# Patient Record
Sex: FEMALE | Race: ASIAN | NOT HISPANIC OR LATINO | Employment: OTHER | URBAN - METROPOLITAN AREA
[De-identification: names, ages, dates, MRNs, and addresses within clinical notes are randomized per-mention and may not be internally consistent; named-entity substitution may affect disease eponyms.]

---

## 2018-05-30 ENCOUNTER — PATIENT OUTREACH (OUTPATIENT)
Dept: HEALTH INFORMATION MANAGEMENT | Facility: OTHER | Age: 78
End: 2018-05-30

## 2018-05-30 NOTE — PROGRESS NOTES
Outcome: Patient declined to verify , unable to inform of being on a recorded line or why we were calling as the patient hung up.

## 2018-09-04 PROCEDURE — 90662 IIV NO PRSV INCREASED AG IM: CPT | Performed by: REGISTERED NURSE

## 2018-09-04 PROCEDURE — G0008 ADMIN INFLUENZA VIRUS VAC: HCPCS | Performed by: REGISTERED NURSE

## 2018-09-10 ENCOUNTER — IMMUNIZATION (OUTPATIENT)
Dept: SOCIAL WORK | Facility: CLINIC | Age: 78
End: 2018-09-10
Payer: MEDICARE

## 2018-09-10 DIAGNOSIS — Z23 NEED FOR VACCINATION: ICD-10-CM

## 2018-10-10 ENCOUNTER — PATIENT OUTREACH (OUTPATIENT)
Dept: HEALTH INFORMATION MANAGEMENT | Facility: OTHER | Age: 78
End: 2018-10-10

## 2018-10-10 NOTE — PROGRESS NOTES
Pt declined to verify any type of info and said that if she needs something she will call back. Call back number was given.

## 2021-01-11 DIAGNOSIS — Z23 NEED FOR VACCINATION: ICD-10-CM

## 2021-04-05 ENCOUNTER — TELEPHONE (OUTPATIENT)
Dept: SCHEDULING | Facility: IMAGING CENTER | Age: 81
End: 2021-04-05

## 2021-04-12 ENCOUNTER — TELEPHONE (OUTPATIENT)
Dept: MEDICAL GROUP | Facility: PHYSICIAN GROUP | Age: 81
End: 2021-04-12

## 2021-04-12 NOTE — TELEPHONE ENCOUNTER
NEW PATIENT VISIT PRE-VISIT PLANNING    1.  E[employer] Patient is checked in Patient Demographics?Yes    2.  Immunizations were updated in Epic using Reconcile Outside Information activity? Yes         3.  Is this appointment scheduled as a Hospital Follow-Up? No    4.  Patient is due for the following Health Maintenance Topics:   Health Maintenance Due   Topic Date Due   • Annual Wellness Visit  Never done   • IMM DTaP/Tdap/Td Vaccine (1 - Tdap) Never done   • PAP SMEAR  Never done   • COLONOSCOPY  Never done   • IMM ZOSTER VACCINES (1 of 2) Never done   • IMM PNEUMOCOCCAL VACCINE: 65+ Years (1 of 1 - PPSV23) Never done   • MAMMOGRAM  01/09/2016   • BONE DENSITY  01/09/2020       5.  Reviewed/Updated the following with patient:       •   Preferred Pharmacy? Yes       •   Preferred Lab? Yes       •   Preferred Communication? Yes       •   Allergies? Yes       •   Medications? YES. Was Abstract Encounter opened and chart updated? YES- mo meds        •   Social History? Yes       •   Family History (document living status of immediate family members and if + hx of  cancer, diabetes, hypertension, hyperlipidemia, heart attack, stroke) Yes    6.  Updated Care Team?       •   DME Company (gait device, O2, CPAP, etc.) N\A       •   Other Specialists (eye doctor, derm, GYN, cardiology, endo, etc): N\A    7.  AHA (Puls8) form printed for Provider? Email sent to [facility] requesting form

## 2021-04-13 ENCOUNTER — APPOINTMENT (OUTPATIENT)
Dept: MEDICAL GROUP | Facility: PHYSICIAN GROUP | Age: 81
End: 2021-04-13
Payer: MEDICARE

## 2021-05-17 ENCOUNTER — OFFICE VISIT (OUTPATIENT)
Dept: MEDICAL GROUP | Facility: PHYSICIAN GROUP | Age: 81
End: 2021-05-17
Payer: MEDICARE

## 2021-05-17 ENCOUNTER — HOSPITAL ENCOUNTER (OUTPATIENT)
Facility: MEDICAL CENTER | Age: 81
End: 2021-05-17
Attending: INTERNAL MEDICINE
Payer: MEDICARE

## 2021-05-17 VITALS
TEMPERATURE: 97.3 F | HEIGHT: 58 IN | SYSTOLIC BLOOD PRESSURE: 134 MMHG | OXYGEN SATURATION: 97 % | HEART RATE: 82 BPM | BODY MASS INDEX: 23.47 KG/M2 | WEIGHT: 111.8 LBS | DIASTOLIC BLOOD PRESSURE: 60 MMHG

## 2021-05-17 DIAGNOSIS — E55.9 VITAMIN D DEFICIENCY: ICD-10-CM

## 2021-05-17 DIAGNOSIS — Z78.0 MENOPAUSE: ICD-10-CM

## 2021-05-17 DIAGNOSIS — E78.2 MIXED HYPERLIPIDEMIA: ICD-10-CM

## 2021-05-17 DIAGNOSIS — M85.89 OSTEOPENIA OF MULTIPLE SITES: ICD-10-CM

## 2021-05-17 DIAGNOSIS — R41.3 MEMORY LOSS: ICD-10-CM

## 2021-05-17 DIAGNOSIS — E53.8 B12 DEFICIENCY: ICD-10-CM

## 2021-05-17 DIAGNOSIS — R73.01 ELEVATED FASTING BLOOD SUGAR: ICD-10-CM

## 2021-05-17 DIAGNOSIS — H91.93 BILATERAL HEARING LOSS, UNSPECIFIED HEARING LOSS TYPE: ICD-10-CM

## 2021-05-17 LAB
ALBUMIN SERPL BCP-MCNC: 4.3 G/DL (ref 3.2–4.9)
ALBUMIN/GLOB SERPL: 1.2 G/DL
ALP SERPL-CCNC: 63 U/L (ref 30–99)
ALT SERPL-CCNC: 16 U/L (ref 2–50)
ANION GAP SERPL CALC-SCNC: 10 MMOL/L (ref 7–16)
AST SERPL-CCNC: 26 U/L (ref 12–45)
BASOPHILS # BLD AUTO: 0.6 % (ref 0–1.8)
BASOPHILS # BLD: 0.05 K/UL (ref 0–0.12)
BILIRUB SERPL-MCNC: 0.3 MG/DL (ref 0.1–1.5)
BUN SERPL-MCNC: 14 MG/DL (ref 8–22)
CALCIUM SERPL-MCNC: 9.7 MG/DL (ref 8.5–10.5)
CHLORIDE SERPL-SCNC: 106 MMOL/L (ref 96–112)
CHOLEST SERPL-MCNC: 166 MG/DL (ref 100–199)
CO2 SERPL-SCNC: 25 MMOL/L (ref 20–33)
CREAT SERPL-MCNC: 0.61 MG/DL (ref 0.5–1.4)
EOSINOPHIL # BLD AUTO: 0.08 K/UL (ref 0–0.51)
EOSINOPHIL NFR BLD: 1 % (ref 0–6.9)
ERYTHROCYTE [DISTWIDTH] IN BLOOD BY AUTOMATED COUNT: 45.2 FL (ref 35.9–50)
GLOBULIN SER CALC-MCNC: 3.5 G/DL (ref 1.9–3.5)
GLUCOSE SERPL-MCNC: 119 MG/DL (ref 65–99)
HCT VFR BLD AUTO: 46.5 % (ref 37–47)
HDLC SERPL-MCNC: 73 MG/DL
HGB BLD-MCNC: 15.3 G/DL (ref 12–16)
IMM GRANULOCYTES # BLD AUTO: 0.02 K/UL (ref 0–0.11)
IMM GRANULOCYTES NFR BLD AUTO: 0.3 % (ref 0–0.9)
LDLC SERPL CALC-MCNC: 64 MG/DL
LYMPHOCYTES # BLD AUTO: 2.58 K/UL (ref 1–4.8)
LYMPHOCYTES NFR BLD: 32.5 % (ref 22–41)
MCH RBC QN AUTO: 31.5 PG (ref 27–33)
MCHC RBC AUTO-ENTMCNC: 32.9 G/DL (ref 33.6–35)
MCV RBC AUTO: 95.7 FL (ref 81.4–97.8)
MONOCYTES # BLD AUTO: 0.49 K/UL (ref 0–0.85)
MONOCYTES NFR BLD AUTO: 6.2 % (ref 0–13.4)
NEUTROPHILS # BLD AUTO: 4.71 K/UL (ref 2–7.15)
NEUTROPHILS NFR BLD: 59.4 % (ref 44–72)
NRBC # BLD AUTO: 0 K/UL
NRBC BLD-RTO: 0 /100 WBC
PLATELET # BLD AUTO: 284 K/UL (ref 164–446)
PMV BLD AUTO: 9.8 FL (ref 9–12.9)
POTASSIUM SERPL-SCNC: 4.1 MMOL/L (ref 3.6–5.5)
PROT SERPL-MCNC: 7.8 G/DL (ref 6–8.2)
RBC # BLD AUTO: 4.86 M/UL (ref 4.2–5.4)
SODIUM SERPL-SCNC: 141 MMOL/L (ref 135–145)
TRIGL SERPL-MCNC: 145 MG/DL (ref 0–149)
TSH SERPL DL<=0.005 MIU/L-ACNC: 1.37 UIU/ML (ref 0.38–5.33)
VIT B12 SERPL-MCNC: 337 PG/ML (ref 211–911)
WBC # BLD AUTO: 7.9 K/UL (ref 4.8–10.8)

## 2021-05-17 PROCEDURE — 82607 VITAMIN B-12: CPT

## 2021-05-17 PROCEDURE — 82306 VITAMIN D 25 HYDROXY: CPT

## 2021-05-17 PROCEDURE — 80061 LIPID PANEL: CPT

## 2021-05-17 PROCEDURE — 85025 COMPLETE CBC W/AUTO DIFF WBC: CPT

## 2021-05-17 PROCEDURE — 99205 OFFICE O/P NEW HI 60 MIN: CPT | Performed by: INTERNAL MEDICINE

## 2021-05-17 PROCEDURE — 36415 COLL VENOUS BLD VENIPUNCTURE: CPT | Performed by: INTERNAL MEDICINE

## 2021-05-17 PROCEDURE — 84443 ASSAY THYROID STIM HORMONE: CPT

## 2021-05-17 PROCEDURE — 99000 SPECIMEN HANDLING OFFICE-LAB: CPT | Performed by: INTERNAL MEDICINE

## 2021-05-17 PROCEDURE — 80053 COMPREHEN METABOLIC PANEL: CPT

## 2021-05-17 ASSESSMENT — PATIENT HEALTH QUESTIONNAIRE - PHQ9: CLINICAL INTERPRETATION OF PHQ2 SCORE: 0

## 2021-05-17 NOTE — ASSESSMENT & PLAN NOTE
Chronic and ongoing problem. Will update cholesterol levels, high HDL protective. Likely hold off on pharmacotherapy as she has no active history of CAD or CVA.

## 2021-05-17 NOTE — ASSESSMENT & PLAN NOTE
New and decompensated problem. Placed stat referral for audiologist, appointment options give to patient and her daughter. Also recommended they could try to walk in at [facility]. Could definitely be contributing to recent concerns with her memory.

## 2021-05-17 NOTE — NON-PROVIDER
Patient arrived for blood draw. Patient reports fasting for at least 8-10 hours.   Verified patient’s name/date-of-birth and reason for visit before procedure was started. Patient’s left hand cleansed. Venipuncture attempts X1. Blood draw completed on patient’s left hand. Applied pressure afterwards and placed Coban on site of venipuncture with directions for patient to remove within the next hour. Patient tolerated procedure well, no adverse reactions. Patient ambulated safely upon leaving clinic.   Completed labels were placed on blood samples in draw room with patient present. Appropriate blood samples were centrifuged. Blood samples were then placed in locked lab box for  pick-up.

## 2021-05-17 NOTE — ASSESSMENT & PLAN NOTE
New problem, requires follow up. Will update bone density and offer pharmacotherapy if indicated. Will talk about adding calcium and vitamin D at our follow up next week after her blood work returns.

## 2021-05-17 NOTE — ASSESSMENT & PLAN NOTE
New and decompensated problem. Will have the patient and her daughter complete a comprehensive packet regarding cognition, mood, safety, neuropsychiatric behaviors, etc. Will also update lab work to ensure there are no metabolic causes of her memory issue such as kidney/liver disease, diabetes, anemia, etc. Recommend she get in ASAP for hearing evaluation and receive hearing aids if indicated, recommended a pocket talker in the mean time. Seniors helping seniors or the [employer] could also help offer additional companionship for her. She will follow up next week. Recommend she not drive and use the Uber  through her insurance. May need MRI brain.

## 2021-05-17 NOTE — ASSESSMENT & PLAN NOTE
Previous problem that requires follow up. Will check fasting blood sugar and if elevated will perform A1c in the office at our follow up next week.

## 2021-05-17 NOTE — PROGRESS NOTES
Subjective:   Chief Complaint/History of Present Illness:  Libia Frias is a 80 y.o. female established patient who presents today to discuss medical problems as listed below. Libia is accompanied by her daughter, Ena.    Problem   Mixed Hyperlipidemia       Ref. Range 10/6/2014 07:21   Cholesterol,Tot Latest Ref Range: 100 - 199 mg/dL 209 (H)   Triglycerides Latest Ref Range: 0 - 149 mg/dL 99   HDL Latest Ref Range: >=40 mg/dL 79   LDL Latest Ref Range: <100 mg/dL 110 (H)     Remote history of hyperlipidemia. No personal history of coronary artery disease or stroke. No recent cholesterol evaluations. No prior use of cholesterol lowering medications.     Bilateral Hearing Loss    Libia's daughter reports significant decline in her hearing over the recent years. The patient herself does not think it's a big problem. No recent or prior hearing evaluations. Patient's daughter notes she has to yell through the phone.      Memory Loss    Libia's daughter, Ena, is [place] town from [place] and provides much of the history. The patient has had progressive memory loss over the past several years, insidious in onset. The challenge is that the patient is very hard of hearing so unclear how much is true memory loss and what is from hearing difficulties or isolation. She continues to drive and reports she does not get lost in familiar areas but avoids driving to new places. She is independent with her ADL's and walks 1.5 miles daily around where she lives. She lives alone and her kids are not local. Does not sound like there have been any safety events.    MoCA administered in clinic was abnormal at 17/30, however some of this due to administration in English and patient's hearing status. I do suspect she has either MCI or mild dementia, but need more collateral history and to improve her hearing status to get a more accurate diagnosis.     Elevated Fasting Blood Sugar       Ref. Range 10/6/2014 07:21   Glucose Latest Ref  "Range: 65 - 99 mg/dL 131 (H)     Remote finding, unclear if this was ever followed up. No known history of DM2 or prediabetes, she is not taking any glucose lowering medications at this time.     Osteopenia of multiple sites with increased FRAX    Bone Density (2015):  lumbar spine T score of -2.2   proximal left femur T score of -2.0     When compared with the most recent study dated 10/23/2012, there has been a 4.9% increase in the bone mineral density of the left femur.     10-year Probability of Fracture:   Major Osteoporotic     15.0%   Hip     6.8%       She has a prior history of osteopenia with increased FRAX. No known fragility fractures. She is not taking calcium or vitamin D at this time.          Current Medications:  Current Outpatient Medications Ordered in Epic   Medication Sig Dispense Refill   • Pediatric Multivit-Minerals-C (COMPLETE MULTI-VITAMIN PO) Take  by mouth.       No current Epic-ordered facility-administered medications on file.          Objective:   Physical Exam:    Vitals: /60 (BP Location: Right arm, Patient Position: Sitting, BP Cuff Size: Adult)   Pulse 82   Temp 36.3 °C (97.3 °F) (Temporal)   Ht 1.48 m (4' 10.25\")   Wt 50.7 kg (111 lb 12.8 oz)   SpO2 97%    BMI: Body mass index is 23.17 kg/m².     Physical Exam  Constitutional:       General: She is not in acute distress.     Appearance: Normal appearance. She is normal weight.   HENT:      Ears:      Comments: Hard of hearing  Eyes:      General: No scleral icterus.  Pulmonary:      Effort: Pulmonary effort is normal. No respiratory distress.   Musculoskeletal:         General: No deformity.      Right lower leg: No edema.      Left lower leg: No edema.   Skin:     General: Skin is warm and dry.      Findings: No rash.   Neurological:      Mental Status: She is alert.      Comments: [place] 17/30, see media for full document    Oriented to month, year, self, and city. Not oriented to date (thought it was 5/21) or day of " the week (thought it was Thursday).   Psychiatric:      Comments: Denies anxiety or depression               Assessment and Plan:   Libia is a 80 y.o. female with the following:  Problem List Items Addressed This Visit     Mixed hyperlipidemia     Chronic and ongoing problem. Will update cholesterol levels, high HDL protective. Likely hold off on pharmacotherapy as she has no active history of CAD or CVA.         Relevant Orders    CBC WITH DIFFERENTIAL    Comp Metabolic Panel    Lipid Profile    TSH WITH REFLEX TO FT4    Bilateral hearing loss     New and decompensated problem. Placed stat referral for audiologist, appointment options give to patient and her daughter. Also recommended they could try to walk in at [facility]. Could definitely be contributing to recent concerns with her memory.         Relevant Orders    REFERRAL TO AUDIOLOGY    Memory loss     New and decompensated problem. Will have the patient and her daughter complete a comprehensive packet regarding cognition, mood, safety, neuropsychiatric behaviors, etc. Will also update lab work to ensure there are no metabolic causes of her memory issue such as kidney/liver disease, diabetes, anemia, etc. Recommend she get in [facility] for hearing evaluation and receive hearing aids if indicated, recommended a pocket talker in the mean time. Seniors helping seniors or the [employer] could also help offer additional companionship for her. She will follow up next week. Recommend she not drive and use the Uber  through her insurance. May need MRI brain.         Relevant Orders    VITAMIN B12    Elevated fasting blood sugar     Previous problem that requires follow up. Will check fasting blood sugar and if elevated will perform A1c in the office at our follow up next week.         Osteopenia of multiple sites with increased FRAX     New problem, requires follow up. Will update bone density and offer pharmacotherapy if indicated. Will talk about adding  calcium and vitamin D at our follow up next week after her blood work returns.           Other Visit Diagnoses     Menopause        Relevant Orders    DS-BONE DENSITY STUDY (DEXA)    B12 deficiency        Relevant Orders    VITAMIN B12    Vitamin D deficiency        Relevant Orders    VITAMIN D,25 HYDROXY           RTC: Return in about 1 week (around 5/24/2021).    I spent a total of 68 minutes with record review, exam (MoCA exam administered and details of results given to patient and her daughter), communication with the patient, communication with other providers, and documentation of this encounter.    PLEASE NOTE: This dictation was created using voice recognition software. I have made every reasonable attempt to correct obvious errors, but I expect that there are errors of grammar and possibly content that I did not discover before finalizing the note.      Altagracia Flannery, DO  Geriatric and Internal Medicine  [facility]

## 2021-05-19 LAB — 25(OH)D3 SERPL-MCNC: 16 NG/ML (ref 30–80)

## 2021-05-20 ENCOUNTER — TELEPHONE (OUTPATIENT)
Dept: MEDICAL GROUP | Facility: PHYSICIAN GROUP | Age: 81
End: 2021-05-20

## 2021-05-20 NOTE — TELEPHONE ENCOUNTER
1. Caller Name: Ena Frias  daughter                        Call Back Number: [phone] (home)  Ena [phone]        How would the patient prefer to be contacted with a response: Phone call OK to leave a detailed message    Called and left Ena a message regarding her mom's labs  will go over in more detail at 5/27 appointment.  Called Ena as mom is hard of hearing

## 2021-05-20 NOTE — TELEPHONE ENCOUNTER
----- Message from Altagracia Flannery D.O. sent at 5/19/2021  5:45 PM PDT -----  Vitamin d level is low, remaining labs are stable. Can call and let patient's daughter know (as patient is hard of hearing and has cognitive impairment). Will go through in detail with patient at our follow up on 5/27.

## 2021-05-27 ENCOUNTER — APPOINTMENT (OUTPATIENT)
Dept: MEDICAL GROUP | Facility: PHYSICIAN GROUP | Age: 81
End: 2021-05-27
Payer: MEDICARE

## 2021-06-04 ENCOUNTER — TELEPHONE (OUTPATIENT)
Dept: MEDICAL GROUP | Facility: PHYSICIAN GROUP | Age: 81
End: 2021-06-04

## 2021-06-04 NOTE — TELEPHONE ENCOUNTER
ESTABLISHED PATIENT PRE-VISIT PLANNING     Patient was NOT contacted to complete PVP.     Note: Patient will not be contacted if there is no indication to call.     1.  Reviewed notes from the last few office visits within the medical group: Yes    2.  If any orders were placed at last visit or intended to be done for this visit (i.e. 6 mos follow-up), do we have Results/Consult Notes?         •  Labs - Labs ordered, completed on 05/17/2021 and results are in chart.  Note: If patient appointment is for lab review and patient did not complete labs, check with provider if OK to reschedule patient until labs completed.       •  Imaging - Imaging ordered, NOT completed. Patient advised to complete prior to next appointment.       •  Referrals - Referral ordered, patient has NOT been seen.    3. Is this appointment scheduled as a Hospital Follow-Up? No    4.  Immunizations were updated in Epic using Reconcile Outside Information activity? Yes    5.  Patient is due for the following Health Maintenance Topics:   Health Maintenance Due   Topic Date Due   • Annual Wellness Visit  Never done   • IMM DTaP/Tdap/Td Vaccine (1 - Tdap) Never done   • IMM ZOSTER VACCINES (1 of 2) Never done   • IMM PNEUMOCOCCAL VACCINE: 65+ Years (1 of 1 - PPSV23) Never done   • BONE DENSITY  01/09/2020     6.  AHA (Pulse8) form printed for Provider? No, patient does not have any open alerts

## 2021-08-09 ENCOUNTER — PATIENT OUTREACH (OUTPATIENT)
Dept: HEALTH INFORMATION MANAGEMENT | Facility: OTHER | Age: 81
End: 2021-08-09

## 2021-10-08 ENCOUNTER — APPOINTMENT (OUTPATIENT)
Dept: RADIOLOGY | Facility: MEDICAL CENTER | Age: 81
End: 2021-10-08
Attending: EMERGENCY MEDICINE
Payer: MEDICARE

## 2021-10-08 ENCOUNTER — HOSPITAL ENCOUNTER (EMERGENCY)
Facility: MEDICAL CENTER | Age: 81
End: 2021-10-08
Attending: EMERGENCY MEDICINE
Payer: MEDICARE

## 2021-10-08 VITALS
DIASTOLIC BLOOD PRESSURE: 81 MMHG | WEIGHT: 110 LBS | BODY MASS INDEX: 23.09 KG/M2 | RESPIRATION RATE: 19 BRPM | HEART RATE: 88 BPM | SYSTOLIC BLOOD PRESSURE: 159 MMHG | HEIGHT: 58 IN | OXYGEN SATURATION: 96 % | TEMPERATURE: 97.8 F

## 2021-10-08 DIAGNOSIS — E86.0 DEHYDRATION: ICD-10-CM

## 2021-10-08 DIAGNOSIS — S00.83XA FACIAL CONTUSION, INITIAL ENCOUNTER: ICD-10-CM
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
## 2021-10-08 DIAGNOSIS — S09.90XA CLOSED HEAD INJURY, INITIAL ENCOUNTER: ICD-10-CM

## 2021-10-08 LAB
ALBUMIN SERPL BCP-MCNC: 4.4 G/DL (ref 3.2–4.9)
ALBUMIN/GLOB SERPL: 1.2 G/DL
ALP SERPL-CCNC: 74 U/L (ref 30–99)
ALT SERPL-CCNC: 41 U/L (ref 2–50)
ANION GAP SERPL CALC-SCNC: 20 MMOL/L (ref 7–16)
AST SERPL-CCNC: 65 U/L (ref 12–45)
BASOPHILS # BLD AUTO: 0.2 % (ref 0–1.8)
BASOPHILS # BLD: 0.03 K/UL (ref 0–0.12)
BILIRUB SERPL-MCNC: 1 MG/DL (ref 0.1–1.5)
BUN SERPL-MCNC: 32 MG/DL (ref 8–22)
CALCIUM SERPL-MCNC: 9.7 MG/DL (ref 8.5–10.5)
CHLORIDE SERPL-SCNC: 108 MMOL/L (ref 96–112)
CO2 SERPL-SCNC: 16 MMOL/L (ref 20–33)
CREAT SERPL-MCNC: 0.39 MG/DL (ref 0.5–1.4)
EKG IMPRESSION: NORMAL
EOSINOPHIL # BLD AUTO: 0 K/UL (ref 0–0.51)
EOSINOPHIL NFR BLD: 0 % (ref 0–6.9)
ERYTHROCYTE [DISTWIDTH] IN BLOOD BY AUTOMATED COUNT: 44.9 FL (ref 35.9–50)
GLOBULIN SER CALC-MCNC: 3.7 G/DL (ref 1.9–3.5)
GLUCOSE SERPL-MCNC: 137 MG/DL (ref 65–99)
HCT VFR BLD AUTO: 51.6 % (ref 37–47)
HGB BLD-MCNC: 16.9 G/DL (ref 12–16)
IMM GRANULOCYTES # BLD AUTO: 0.08 K/UL (ref 0–0.11)
IMM GRANULOCYTES NFR BLD AUTO: 0.6 % (ref 0–0.9)
LYMPHOCYTES # BLD AUTO: 1 K/UL (ref 1–4.8)
LYMPHOCYTES NFR BLD: 7 % (ref 22–41)
MCH RBC QN AUTO: 31.4 PG (ref 27–33)
MCHC RBC AUTO-ENTMCNC: 32.8 G/DL (ref 33.6–35)
MCV RBC AUTO: 95.9 FL (ref 81.4–97.8)
MONOCYTES # BLD AUTO: 0.62 K/UL (ref 0–0.85)
MONOCYTES NFR BLD AUTO: 4.4 % (ref 0–13.4)
NEUTROPHILS # BLD AUTO: 12.47 K/UL (ref 2–7.15)
NEUTROPHILS NFR BLD: 87.8 % (ref 44–72)
NRBC # BLD AUTO: 0 K/UL
NRBC BLD-RTO: 0 /100 WBC
PLATELET # BLD AUTO: 232 K/UL (ref 164–446)
PMV BLD AUTO: 9.4 FL (ref 9–12.9)
POTASSIUM SERPL-SCNC: 3.7 MMOL/L (ref 3.6–5.5)
PROT SERPL-MCNC: 8.1 G/DL (ref 6–8.2)
RBC # BLD AUTO: 5.38 M/UL (ref 4.2–5.4)
SODIUM SERPL-SCNC: 144 MMOL/L (ref 135–145)
TROPONIN T SERPL-MCNC: 16 NG/L (ref 6–19)
WBC # BLD AUTO: 14.2 K/UL (ref 4.8–10.8)

## 2021-10-08 PROCEDURE — 80053 COMPREHEN METABOLIC PANEL: CPT

## 2021-10-08 PROCEDURE — 93005 ELECTROCARDIOGRAM TRACING: CPT | Performed by: EMERGENCY MEDICINE

## 2021-10-08 PROCEDURE — 700105 HCHG RX REV CODE 258: Performed by: EMERGENCY MEDICINE

## 2021-10-08 PROCEDURE — 84484 ASSAY OF TROPONIN QUANT: CPT

## 2021-10-08 PROCEDURE — 70450 CT HEAD/BRAIN W/O DYE: CPT | Mod: MG

## 2021-10-08 PROCEDURE — 85025 COMPLETE CBC W/AUTO DIFF WBC: CPT

## 2021-10-08 PROCEDURE — 99284 EMERGENCY DEPT VISIT MOD MDM: CPT

## 2021-10-08 RX ORDER — SODIUM CHLORIDE, SODIUM LACTATE, POTASSIUM CHLORIDE, CALCIUM CHLORIDE 600; 310; 30; 20 MG/100ML; MG/100ML; MG/100ML; MG/100ML
1000 INJECTION, SOLUTION INTRAVENOUS ONCE
Status: COMPLETED | OUTPATIENT
Start: 2021-10-08 | End: 2021-10-08

## 2021-10-08 RX ADMIN — SODIUM CHLORIDE, POTASSIUM CHLORIDE, SODIUM LACTATE AND CALCIUM CHLORIDE 1000 ML: 600; 310; 30; 20 INJECTION, SOLUTION INTRAVENOUS at 12:03

## 2021-10-08 ASSESSMENT — FIBROSIS 4 INDEX: FIB4 SCORE: 1.88

## 2021-10-08 NOTE — ED PROVIDER NOTES
ED Provider Note    Scribed for Osiel Tapia M.D. by Lisa Can. 10/8/2021  11:28 AM    Primary care provider: Altagracia Flannery D.O.  Means of arrival: EMS  History obtained from: Patient  History limited by: None    CHIEF COMPLAINT  Chief Complaint   Patient presents with   • T-5000 GLF     found on ground by daughter this morning, unknown down time   • Facial Swelling     and bruising to (r) eye       HPI  Libia Frias is a 80 y.o. female who presents to the Emergency Department after being found down today. Per EMS, the patient's daughter had not heard from her for a few days so she flew into town to check on her. When the daughter arrived at the patient's house this morning, she found the patient face down on the floor surrounded by stool. The patient reportedly did not recognize her daughter initially. Patient states that she was fine last night and that she fell down this morning. She has a grade 1 pressure sore to the sacrum and ecchymosis and swelling above the right eye. She denies neck pain. Per EMS, the patient has no history of pertinent medical problems and is not on blood thinners.     REVIEW OF SYSTEMS  Pertinent negatives include no neck pain. As above, all other systems reviewed and are negative.   See HPI for further details.     PAST MEDICAL HISTORY       SURGICAL HISTORY  patient denies any surgical history    SOCIAL HISTORY  Social History     Tobacco Use   • Smoking status: Never Smoker   • Smokeless tobacco: Never Used   • Tobacco comment: continued abstinence   Vaping Use   • Vaping Use: Never used   Substance Use Topics   • Alcohol use: Yes     Comment: 1 glass of wine per month   • Drug use: None noted      Social History     Substance and Sexual Activity   Drug Use None noted       FAMILY HISTORY  Family History   Problem Relation Age of Onset   • Heart Disease Brother    • Cancer Neg Hx    • Diabetes Neg Hx    • Hypertension Neg Hx    • Hyperlipidemia Neg Hx        CURRENT  "MEDICATIONS  Home Medications     Reviewed by Noni Rizo R.N. (Registered Nurse) on 10/08/21 at 1144  Med List Status: Partial   Medication Last Dose Status   Pediatric Multivit-Minerals-C (COMPLETE MULTI-VITAMIN PO)  Active                ALLERGIES  No Known Allergies    PHYSICAL EXAM  VITAL SIGNS: BP (!) 182/89   Pulse 92   Temp 36.6 °C (97.9 °F) (Oral)   Resp 18   Ht 1.473 m (4' 10\")   Wt 49.9 kg (110 lb)   SpO2 96%   BMI 22.99 kg/m²   Constitutional: Well developed, Well nourished, No acute distress, Non-toxic appearance.   HENT: Normocephalic, Bilateral external ears normal, Oropharynx is clear mucous membranes are moist. No oral exudates or nasal discharge. Right maxillofacial swelling.   Eyes: Pupils are equal round and reactive, EOMI, Conjunctiva normal, No discharge.   Neck: Normal range of motion, No tenderness, Supple, No stridor. No meningismus.  Lymphatic: No lymphadenopathy noted.   Cardiovascular: Regular rate and rhythm without murmur rub or gallop.  Thorax & Lungs: Clear breath sounds bilaterally without wheezes, rhonchi or rales. There is no chest wall tenderness.   Abdomen: Soft non-tender non-distended. There is no rebound or guarding. No organomegaly is appreciated. Bowel sounds are normal.  Skin: Periorbital ecchymosis bilaterally without rash.   Back: No CVA or spinal tenderness.   Extremities: Intact distal pulses, No edema, No tenderness, No cyanosis, No clubbing. Capillary refill is less than 2 seconds.  Musculoskeletal: Good range of motion in all major joints. No major deformities noted. Pressure sore grade 1 of sacrum. Left hip ecchymosis.  Neurologic: Alert & oriented x 3, Normal motor function, Normal sensory function, No focal deficits noted. Reflexes are normal.  Psychiatric: Affect normal, Judgment normal, Mood normal. There is no suicidal ideation or patient reported hallucinations.       DIAGNOSTIC STUDIES / PROCEDURES    LABS  Labs Reviewed   CBC WITH " DIFFERENTIAL - Abnormal; Notable for the following components:       Result Value    WBC 14.2 (*)     Hemoglobin 16.9 (*)     Hematocrit 51.6 (*)     MCHC 32.8 (*)     Neutrophils-Polys 87.80 (*)     Lymphocytes 7.00 (*)     Neutrophils (Absolute) 12.47 (*)     All other components within normal limits   COMP METABOLIC PANEL - Abnormal; Notable for the following components:    Co2 16 (*)     Anion Gap 20.0 (*)     Glucose 137 (*)     Bun 32 (*)     Creatinine 0.39 (*)     AST(SGOT) 65 (*)     Globulin 3.7 (*)     All other components within normal limits   TROPONIN   ESTIMATED GFR      All labs reviewed by me.    EKG Interpretation:  Results for orders placed or performed during the hospital encounter of 10/08/21   EKG (NOW)   Result Value Ref Range    Report       [facility] Emergency Dept.    Test Date:  2021-10-08  Pt Name:    MADDI DURANT                     Department: ER  MRN:        [id number]                      Room:       [facility]  Gender:     Female                       Technician: [id number]  :        [id number]                   Requested By:JENNIFER TRIPP  Order #:    [id number]                    Reading MD: JENNIFER TRIPP MD    Measurements  Intervals                                Axis  Rate:       91                           P:          0  WA:         109                          QRS:        -6  QRSD:       80                           T:          40  QT:         404  QTc:        498    Interpretive Statements  SINUS RHYTHM  SHORT WA INTERVAL, ACCELERATED AV CONDUCTION  BORDERLINE PROLONGED QT INTERVAL  No previous ECG available for comparison  Electronically Signed On 10-8-2021 13:03:40 PDT by JENNIFER TRIPP MD          RADIOLOGY  CT-HEAD W/O   Final Result      1.  Cerebral atrophy.      2.  White matter lucencies most consistent with small vessel ischemic change versus demyelination or gliosis.      3.  Otherwise, Head CT without contrast with no acute findings. No evidence of acute cerebral  infarction, hemorrhage or mass lesion.        The radiologist's interpretation of all radiological studies have been reviewed by me.    COURSE & MEDICAL DECISION MAKING  Nursing notes, VS, PMSFHx reviewed in chart.    11:28 AM Patient seen and examined at charge desk for TBI protocol. Ordered for CT-Head, labs, and EKG to evaluate. Patient was treated with LR bolus for her symptoms.      EKG demonstrates no evidence of acute ischemic changes or dysrhythmia.    White blood cell count elevated at 14,000 with 88% PMN shift but no evidence of significant anemia and in fact the patient has some polycythemia which is likely secondary to dehydration given a BUN of 32 and a mild acidosis with an anion gap of 20    HYDRATION: Based on the patient's presentation of Dehydration the patient was given IV fluids. IV Hydration was used because oral hydration was not adequate alone. Upon recheck following hydration, the patient was improved.     12:51 PM Updated family on the above findings.  She is tolerating oral fluid challenge and has finished her liter of lactated Ringer's    Patient has had high blood pressure while in the emergency department, felt likely secondary to medical condition. Counseled patient to monitor blood pressure at home and follow up with primary care physician.     The patient will return for new or worsening symptoms and is stable at the time of discharge.  Discussed eventual transition to assisted living for this patient who I do not think will do well with independent living given this recent event and daughter is agreeable    DISPOSITION:  Patient will be discharged home in stable condition.    FINAL IMPRESSION  1. Fall, initial encounter    2. Closed head injury, initial encounter    3. Facial contusion, initial encounter    4. Dehydration          I, Lisa Huitron), am scribing for, and in the presence of, Osiel Tapia M.D..    Electronically signed by: Lisa Huitron),  10/8/2021    IOsiel M.D. personally performed the services described in this documentation, as scribed by Lisa Can in my presence, and it is both accurate and complete. C    The note accurately reflects work and decisions made by me.  Osiel Tapia M.D.  10/8/2021  1:14 PM

## 2021-10-08 NOTE — ED TRIAGE NOTES
Libia Frias  Chief Complaint   Patient presents with   • T-5000 GLF     found on ground by daughter this morning, unknown down time   • Facial Swelling     and bruising to (r) eye     biba from home with above complaint.  VSS, no acute distress.   Pt believes she fell this morning, but unknown time down, as daughter last talk to pt 3 days ago.  Pt with multiple old bruises noted to (R) elbow, left hip and right upper face.   FSBS 130 per EMS.   Pt to CT then BL 20, report to ANTONIO Quinonez  On monitor. Call light in reach

## 2021-10-08 NOTE — ED NOTES
Discharge instructions reviewed with patient and signed. IV was removed from arm. They verbalized understanding of follow up instructions. All belongings with patient. They ambulate with a steady gait. Her daughter is driving her home

## 2021-10-08 NOTE — DISCHARGE INSTRUCTIONS
Please keep up on fluid intake and discussed living situation with your family as you likely need higher level of care such as assisted living

## 2021-10-13 ENCOUNTER — TELEPHONE (OUTPATIENT)
Dept: MEDICAL GROUP | Facility: PHYSICIAN GROUP | Age: 81
End: 2021-10-13

## 2021-10-13 NOTE — TELEPHONE ENCOUNTER
Called pt to assist with scheduling f/u appt after being seen in ED. No answer and voice mail has not been set-up, so unable to LVM.  Pt does not have [facility] set-up either.   Will attempt to contact pt again.

## 2021-10-28 ENCOUNTER — TELEPHONE (OUTPATIENT)
Dept: MEDICAL GROUP | Facility: PHYSICIAN GROUP | Age: 81
End: 2021-10-28

## 2021-10-28 NOTE — TELEPHONE ENCOUNTER
ESTABLISHED PATIENT PRE-VISIT PLANNING     Patient was NOT contacted to complete PVP.     Note: Patient will not be contacted if there is no indication to call.     1.  Reviewed notes from the last few office visits within the medical group: Yes    2.  If any orders were placed at last visit or intended to be done for this visit (i.e. 6 mos follow-up), do we have Results/Consult Notes?         •  Labs - Labs ordered, completed on 5/17/2021 and results are in chart.  Note: If patient appointment is for lab review and patient did not complete labs, check with provider if OK to reschedule patient until labs completed.       •  Imaging - Imaging ordered, NOT completed. Patient advised to complete prior to next appointment.       •  Referrals - Referral ordered, patient has NOT been seen.    3. Is this appointment scheduled as a Hospital Follow-Up? No    4.  Immunizations were updated in Epic using Reconcile Outside Information activity? Yes    5.  Patient is due for the following Health Maintenance Topics:   Health Maintenance Due   Topic Date Due   • Annual Wellness Visit  Never done   • IMM DTaP/Tdap/Td Vaccine (1 - Tdap) Never done   • IMM ZOSTER VACCINES (1 of 2) Never done   • IMM PNEUMOCOCCAL VACCINE: 65+ Years (1 of 1 - PPSV23) Never done   • BONE DENSITY  01/09/2020   • IMM INFLUENZA (1) 09/01/2021       6.  AHA (Pulse[employer]) form printed for Provider? No, already completed

## 2021-11-08 ENCOUNTER — TELEPHONE (OUTPATIENT)
Dept: MEDICAL GROUP | Facility: PHYSICIAN GROUP | Age: 81
End: 2021-11-08

## 2021-11-08 NOTE — NON-PROVIDER
Outcome: Per [facility] term date 10/31/2021. No outreach    [facility] Verified: yes

## 2021-11-09 NOTE — TELEPHONE ENCOUNTER
Called Ena (pt's daughter) because pt had appt 11/1/21 to complete paperwork for an Assisted Living facility and appt was cancelled.   Ena stated she figured pt should have physician closer to facility in [place], so they have scheduled appt with new pcp to complete needed paperwork. Ena was thankful that we followed-up with her/pt.